# Patient Record
Sex: FEMALE | Race: WHITE
[De-identification: names, ages, dates, MRNs, and addresses within clinical notes are randomized per-mention and may not be internally consistent; named-entity substitution may affect disease eponyms.]

---

## 2023-03-29 ENCOUNTER — HOSPITAL ENCOUNTER (OUTPATIENT)
Dept: HOSPITAL 19 - SDCO | Age: 78
Discharge: HOME | End: 2023-03-29
Attending: INTERNAL MEDICINE
Payer: MEDICARE

## 2023-03-29 VITALS — WEIGHT: 145.06 LBS | HEIGHT: 63 IN | BODY MASS INDEX: 25.7 KG/M2

## 2023-03-29 VITALS — SYSTOLIC BLOOD PRESSURE: 132 MMHG | HEART RATE: 70 BPM | DIASTOLIC BLOOD PRESSURE: 71 MMHG

## 2023-03-29 VITALS — TEMPERATURE: 98.1 F | DIASTOLIC BLOOD PRESSURE: 72 MMHG | SYSTOLIC BLOOD PRESSURE: 121 MMHG | HEART RATE: 71 BPM

## 2023-03-29 VITALS — SYSTOLIC BLOOD PRESSURE: 123 MMHG | DIASTOLIC BLOOD PRESSURE: 70 MMHG | HEART RATE: 74 BPM | TEMPERATURE: 97.6 F

## 2023-03-29 VITALS — DIASTOLIC BLOOD PRESSURE: 76 MMHG | SYSTOLIC BLOOD PRESSURE: 122 MMHG | HEART RATE: 68 BPM

## 2023-03-29 DIAGNOSIS — K62.89: ICD-10-CM

## 2023-03-29 DIAGNOSIS — K44.9: ICD-10-CM

## 2023-03-29 DIAGNOSIS — D12.2: ICD-10-CM

## 2023-03-29 DIAGNOSIS — K29.51: ICD-10-CM

## 2023-03-29 DIAGNOSIS — Z87.891: ICD-10-CM

## 2023-03-29 DIAGNOSIS — Z12.11: Primary | ICD-10-CM

## 2023-03-29 DIAGNOSIS — K57.30: ICD-10-CM

## 2023-03-29 DIAGNOSIS — Z28.310: ICD-10-CM

## 2023-03-29 DIAGNOSIS — R93.3: ICD-10-CM

## 2023-03-29 NOTE — NUR
1210: PATIENT TO BAY 7 PER CART FROM ENDO SUITE. VS STABLE. BREATHING EVEN AND
UNLABORED. PATIENT GIVEN APPLESAUCE AND DIET SODA. PATIENT HAS NO CONCERNS AT
THIS TIME. RESTING IN RECLINER. CALL LIGHT IN REACH.
 
1225: DR. GALARZA IN TO SPEAK WITH PATIENT. DR. GALARZA GAVE INSTRUCTIONS ON
INFILTRATED IV SITE. VS REMAINS STABLE. PATIENT TOLERATING FOOD AND DRINK. NO
CONCERNS NOTED AT THIS TIME. CALL LIGHT IN REACH
 
1240: VS REMAIN STABLE. DISCHARGE EDUCATION COMPLETED. PATIENT STATED
UNDERSTANDING OF INSTRUCTIONS. DISCHARGE PAPERWORK GIVEN TO PATIENT. IV DC'D
AT THIS TIME. PATIENT DENIES ASSISTANCE WITH DRESSING.
 
1310: PATIENT OFF UNIT VIA WHEELCHAIR. PATIENT DISCHARGED TO HOME WITH 
VIA PERSONAL VEHICLE.